# Patient Record
Sex: MALE | Race: WHITE | NOT HISPANIC OR LATINO | URBAN - METROPOLITAN AREA
[De-identification: names, ages, dates, MRNs, and addresses within clinical notes are randomized per-mention and may not be internally consistent; named-entity substitution may affect disease eponyms.]

---

## 2022-06-04 ENCOUNTER — TELEPHONE ENCOUNTER (OUTPATIENT)
Dept: URBAN - METROPOLITAN AREA CLINIC 68 | Facility: CLINIC | Age: 77
End: 2022-06-04

## 2022-06-04 RX ORDER — POLYETHYLENE GLYCOL 3350, SODIUM SULFATE, SODIUM CHLORIDE, POTASSIUM CHLORIDE, ASCORBIC ACID, SODIUM ASCORBATE 7.5-2.691G
KIT ORAL AS DIRECTED
Qty: 1 | Refills: 0 | OUTPATIENT
Start: 2014-02-24 | End: 2014-02-25

## 2022-06-04 RX ORDER — FLUCONAZOLE 100 MG/1
TABLET ORAL AS DIRECTED
Qty: 11 | Refills: 0 | OUTPATIENT
Start: 2019-06-25 | End: 2019-07-05

## 2022-06-04 RX ORDER — SODIUM SULFATE, POTASSIUM SULFATE, MAGNESIUM SULFATE 17.5; 3.13; 1.6 G/ML; G/ML; G/ML
SOLUTION, CONCENTRATE ORAL AS DIRECTED
Qty: 1 | Refills: 0 | OUTPATIENT
Start: 2019-06-19 | End: 2019-06-20

## 2022-06-04 RX ORDER — SODIUM SULFATE, POTASSIUM SULFATE, MAGNESIUM SULFATE 17.5; 3.13; 1.6 G/ML; G/ML; G/ML
SOLUTION, CONCENTRATE ORAL AS DIRECTED
Qty: 1 | Refills: 0 | OUTPATIENT
Start: 2019-06-20 | End: 2019-06-21

## 2022-06-04 RX ORDER — SODIUM SULFATE, POTASSIUM SULFATE, MAGNESIUM SULFATE 17.5; 3.13; 1.6 G/ML; G/ML; G/ML
SOLUTION, CONCENTRATE ORAL AS DIRECTED
Qty: 1 | Refills: 0 | OUTPATIENT
Start: 2019-05-29 | End: 2019-05-30

## 2022-06-04 RX ORDER — RABEPRAZOLE SODIUM 20 MG/1
TABLET, DELAYED RELEASE ORAL
OUTPATIENT
Start: 2008-08-11 | End: 2014-02-24

## 2022-06-05 ENCOUNTER — TELEPHONE ENCOUNTER (OUTPATIENT)
Dept: URBAN - METROPOLITAN AREA CLINIC 68 | Facility: CLINIC | Age: 77
End: 2022-06-05

## 2022-06-05 RX ORDER — LORATADINE 5 MG/5 ML
CLARITIN( 10MG ORAL  DAILY ) ACTIVE -HX ENTRY SOLUTION, ORAL ORAL DAILY
Status: ACTIVE | COMMUNITY
Start: 2020-05-14

## 2022-06-05 RX ORDER — OMEPRAZOLE 20 MG/1
OMEPRAZOLE( 20MG ORAL  DAILY ) ACTIVE -HX ENTRY TABLET, DELAYED RELEASE ORAL DAILY
Status: ACTIVE | COMMUNITY
Start: 2020-05-14

## 2022-06-05 RX ORDER — APIXABAN 5 MG/1
ELIQUIS( 5MG ORAL 1 TWICE A DAY ) ACTIVE -HX ENTRY TABLET, FILM COATED ORAL TWICE A DAY
Status: ACTIVE | COMMUNITY
Start: 2020-05-14

## 2022-06-05 RX ORDER — ROSUVASTATIN CALCIUM 20 MG
CRESTOR( 20MG ORAL 1 DAILY ) ACTIVE -HX ENTRY TABLET ORAL DAILY
Status: ACTIVE | COMMUNITY
Start: 2020-05-14

## 2022-06-15 ENCOUNTER — TELEPHONE ENCOUNTER (OUTPATIENT)
Dept: URBAN - METROPOLITAN AREA CLINIC 68 | Facility: CLINIC | Age: 77
End: 2022-06-15

## 2022-06-25 ENCOUNTER — OFFICE VISIT (OUTPATIENT)
Dept: URBAN - METROPOLITAN AREA CLINIC 68 | Facility: CLINIC | Age: 77
End: 2022-06-25

## 2022-06-25 ENCOUNTER — TELEPHONE ENCOUNTER (OUTPATIENT)
Age: 77
End: 2022-06-25

## 2022-06-25 RX ORDER — CHOLESTYRAMINE 4 G/9G
QUESTRAN(    1 SCOOP IN OJ EACH DAY ) INACTIVE -HX ENTRY POWDER, FOR SUSPENSION ORAL
OUTPATIENT
Start: 2008-08-15

## 2022-06-25 RX ORDER — SODIUM SULFATE, POTASSIUM SULFATE, MAGNESIUM SULFATE 17.5; 3.13; 1.6 G/ML; G/ML; G/ML
SOLUTION, CONCENTRATE ORAL AS DIRECTED
Qty: 1 | Refills: 0 | OUTPATIENT
Start: 2019-05-29 | End: 2019-05-30

## 2022-06-25 RX ORDER — ASPIRIN 81 MG/1
HCA ASPIRIN(    1 TABLET DAILY ) INACTIVE -HX ENTRY TABLET, COATED ORAL
OUTPATIENT
Start: 2008-08-11

## 2022-06-25 RX ORDER — RABEPRAZOLE SODIUM 20 MG/1
TABLET, DELAYED RELEASE ORAL
OUTPATIENT
Start: 2008-08-11 | End: 2014-02-24

## 2022-06-25 RX ORDER — SODIUM SULFATE, POTASSIUM SULFATE, MAGNESIUM SULFATE 17.5; 3.13; 1.6 G/ML; G/ML; G/ML
SOLUTION, CONCENTRATE ORAL AS DIRECTED
Qty: 1 | Refills: 0 | OUTPATIENT
Start: 2019-06-19 | End: 2019-06-20

## 2022-06-25 RX ORDER — POLYETHYLENE GLYCOL 3350, SODIUM SULFATE, SODIUM CHLORIDE, POTASSIUM CHLORIDE, ASCORBIC ACID, SODIUM ASCORBATE 7.5-2.691G
KIT ORAL AS DIRECTED
Qty: 1 | Refills: 0 | OUTPATIENT
Start: 2014-02-24 | End: 2014-02-25

## 2022-06-25 RX ORDER — SODIUM SULFATE, POTASSIUM SULFATE, MAGNESIUM SULFATE 17.5; 3.13; 1.6 G/ML; G/ML; G/ML
SOLUTION, CONCENTRATE ORAL AS DIRECTED
Qty: 1 | Refills: 0 | OUTPATIENT
Start: 2019-06-20 | End: 2019-06-21

## 2022-06-25 RX ORDER — FLUCONAZOLE 100 MG/1
TABLET ORAL AS DIRECTED
Qty: 11 | Refills: 0 | OUTPATIENT
Start: 2019-06-25 | End: 2019-07-05

## 2022-06-26 ENCOUNTER — TELEPHONE ENCOUNTER (OUTPATIENT)
Age: 77
End: 2022-06-26

## 2022-06-26 RX ORDER — OMEPRAZOLE 20 MG/1
OMEPRAZOLE( 20MG ORAL  DAILY ) ACTIVE -HX ENTRY TABLET, DELAYED RELEASE ORAL DAILY
Status: ACTIVE | COMMUNITY
Start: 2020-05-14

## 2022-06-26 RX ORDER — VERAPAMIL HYDROCHLORIDE 100 MG/1
VERAPAMIL HCL(   1 TWICE A DAY ) ACTIVE -HX ENTRY CAPSULE, EXTENDED RELEASE ORAL TWICE A DAY
Status: ACTIVE | COMMUNITY
Start: 2008-08-11

## 2022-06-26 RX ORDER — LORATADINE 5 MG/5 ML
CLARITIN( 10MG ORAL  DAILY ) ACTIVE -HX ENTRY SOLUTION, ORAL ORAL DAILY
Status: ACTIVE | COMMUNITY
Start: 2020-05-14

## 2022-06-26 RX ORDER — ROSUVASTATIN CALCIUM 20 MG
CRESTOR( 20MG ORAL 1 DAILY ) ACTIVE -HX ENTRY TABLET ORAL DAILY
Status: ACTIVE | COMMUNITY
Start: 2020-05-14

## 2022-06-26 RX ORDER — APIXABAN 5 MG/1
ELIQUIS( 5MG ORAL 1 TWICE A DAY ) ACTIVE -HX ENTRY TABLET, FILM COATED ORAL TWICE A DAY
Status: ACTIVE | COMMUNITY
Start: 2020-05-14

## 2023-01-03 ENCOUNTER — OFFICE VISIT (OUTPATIENT)
Dept: URBAN - METROPOLITAN AREA CLINIC 68 | Facility: CLINIC | Age: 78
End: 2023-01-03

## 2023-01-03 RX ORDER — APIXABAN 5 MG/1
ELIQUIS( 5MG ORAL 1 TWICE A DAY ) ACTIVE -HX ENTRY TABLET, FILM COATED ORAL TWICE A DAY
Status: ACTIVE | COMMUNITY
Start: 2020-05-14

## 2023-01-03 RX ORDER — SOD SULF/POT CHLORIDE/MAG SULF 1.479 G
AS DIRECTED TABLET ORAL ONCE
Qty: 1 KIT | OUTPATIENT

## 2023-01-03 RX ORDER — LORATADINE 5 MG/5 ML
CLARITIN( 10MG ORAL  DAILY ) ACTIVE -HX ENTRY SOLUTION, ORAL ORAL DAILY
Status: DISCONTINUED | COMMUNITY
Start: 2020-05-14

## 2023-01-03 RX ORDER — PEPPERMINT OIL 90 MG
AS DIRECTED CAPSULE, DELAYED, AND EXTENDED RELEASE ORAL
Status: ACTIVE | COMMUNITY

## 2023-01-03 RX ORDER — ROSUVASTATIN CALCIUM 20 MG
CRESTOR( 20MG ORAL 1 DAILY ) ACTIVE -HX ENTRY TABLET ORAL DAILY
Status: ACTIVE | COMMUNITY
Start: 2020-05-14

## 2023-01-03 RX ORDER — OMEPRAZOLE 20 MG/1
OMEPRAZOLE( 20MG ORAL  DAILY ) ACTIVE -HX ENTRY TABLET, DELAYED RELEASE ORAL DAILY
Status: ACTIVE | COMMUNITY
Start: 2020-05-14

## 2023-01-03 NOTE — EXAM-MIGRATED EXAMINATIONS
General Examination: General appearance: -> alert, pleasant, well-nourished and in no acute distress   Head: -> normocephalic, atraumatic   Eyes: -> normal   Neck / thyroid: -> neck is supple, with full range of motion and no cervical lymphadenopathy   Skin: -> skin is warm and dry, with no rashes, good skin turgor and normal hair distribution   Heart: -> regular rate and rhythm without murmurs, gallops, clicks or rubs   Lungs: -> clear to auscultation bilaterally, with good air movement and no rales, rhonchi or wheezes   Chest: -> chest wall with no costochondral junction tenderness, no rib deformity and normal shape and expansion   Abdomen: -> soft with good bowel sounds, nontender, and no masses or hepatosplenomegaly , negative Ga's sign   Extremities: -> normal extremity with no clubbing, cyanosis or edema   Neurologic: -> alert and oriented

## 2023-02-09 ENCOUNTER — TELEPHONE ENCOUNTER (OUTPATIENT)
Dept: URBAN - METROPOLITAN AREA CLINIC 68 | Facility: CLINIC | Age: 78
End: 2023-02-09

## 2023-02-14 ENCOUNTER — OFFICE VISIT (OUTPATIENT)
Dept: URBAN - METROPOLITAN AREA SURGERY CENTER 12 | Facility: SURGERY CENTER | Age: 78
End: 2023-02-14

## 2023-04-08 ENCOUNTER — TELEPHONE ENCOUNTER (OUTPATIENT)
Dept: URBAN - METROPOLITAN AREA CLINIC 68 | Facility: CLINIC | Age: 78
End: 2023-04-08

## 2023-04-11 ENCOUNTER — OFFICE VISIT (OUTPATIENT)
Dept: URBAN - METROPOLITAN AREA SURGERY CENTER 12 | Facility: SURGERY CENTER | Age: 78
End: 2023-04-11

## 2023-04-11 RX ORDER — OMEPRAZOLE 20 MG/1
OMEPRAZOLE( 20MG ORAL  DAILY ) ACTIVE -HX ENTRY TABLET, DELAYED RELEASE ORAL DAILY
Status: ACTIVE | COMMUNITY
Start: 2020-05-14

## 2023-04-11 RX ORDER — FLUCONAZOLE 100 MG/1
1 TABLET TABLET ORAL DAILY
Qty: 14 TABLET | OUTPATIENT

## 2023-04-11 RX ORDER — ROSUVASTATIN CALCIUM 20 MG
CRESTOR( 20MG ORAL 1 DAILY ) ACTIVE -HX ENTRY TABLET ORAL DAILY
Status: ACTIVE | COMMUNITY
Start: 2020-05-14

## 2023-04-11 RX ORDER — SOD SULF/POT CHLORIDE/MAG SULF 1.479 G
AS DIRECTED TABLET ORAL ONCE
Qty: 1 KIT | Status: ACTIVE | COMMUNITY

## 2023-04-11 RX ORDER — APIXABAN 5 MG/1
ELIQUIS( 5MG ORAL 1 TWICE A DAY ) ACTIVE -HX ENTRY TABLET, FILM COATED ORAL TWICE A DAY
Status: ACTIVE | COMMUNITY
Start: 2020-05-14

## 2023-04-11 RX ORDER — PEPPERMINT OIL 90 MG
AS DIRECTED CAPSULE, DELAYED, AND EXTENDED RELEASE ORAL
Status: ACTIVE | COMMUNITY

## 2023-04-17 ENCOUNTER — TELEPHONE ENCOUNTER (OUTPATIENT)
Dept: URBAN - METROPOLITAN AREA CLINIC 68 | Facility: CLINIC | Age: 78
End: 2023-04-17

## 2023-04-28 ENCOUNTER — DASHBOARD ENCOUNTERS (OUTPATIENT)
Age: 78
End: 2023-04-28

## 2023-04-28 ENCOUNTER — OFFICE VISIT (OUTPATIENT)
Dept: URBAN - METROPOLITAN AREA CLINIC 68 | Facility: CLINIC | Age: 78
End: 2023-04-28

## 2023-04-28 RX ORDER — APIXABAN 5 MG/1
ELIQUIS( 5MG ORAL 1 TWICE A DAY ) ACTIVE -HX ENTRY TABLET, FILM COATED ORAL TWICE A DAY
Status: ACTIVE | COMMUNITY
Start: 2020-05-14

## 2023-04-28 RX ORDER — OMEPRAZOLE 20 MG/1
OMEPRAZOLE( 20MG ORAL  DAILY ) ACTIVE -HX ENTRY TABLET, DELAYED RELEASE ORAL DAILY
Status: ACTIVE | COMMUNITY
Start: 2020-05-14

## 2023-04-28 RX ORDER — FLUCONAZOLE 100 MG/1
1 TABLET TABLET ORAL DAILY
Qty: 14 TABLET | Status: ACTIVE | COMMUNITY

## 2023-04-28 RX ORDER — SOD SULF/POT CHLORIDE/MAG SULF 1.479 G
AS DIRECTED TABLET ORAL ONCE
Qty: 1 KIT | Status: ACTIVE | COMMUNITY

## 2023-04-28 RX ORDER — PEPPERMINT OIL 90 MG
AS DIRECTED CAPSULE, DELAYED, AND EXTENDED RELEASE ORAL
Status: ACTIVE | COMMUNITY

## 2023-04-28 RX ORDER — ROSUVASTATIN CALCIUM 20 MG
CRESTOR( 20MG ORAL 1 DAILY ) ACTIVE -HX ENTRY TABLET ORAL DAILY
Status: ACTIVE | COMMUNITY
Start: 2020-05-14

## 2023-04-28 NOTE — HPI-MIGRATED HPI
Transition to Care : 78 y.o. WM with a history of SSBE, personal history of colon polyps, diverticulosis, and esophageal Candidiasis on recent EGD and colonoscopy who is here for follow up. Pathology showed esophageal Candidiasis and he is recommended to rinse thoroughly following use of steroid nasal sprays. He did take Diflucan therapy. He denies any n/v, no gib, no pain. 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 There was diverticulosis and IH.  He describes having some sporadic mid abdominal pain which occurs sporadically and is not related to diet or bowel movements. He is on a PPI daily.

## 2023-05-09 ENCOUNTER — OFFICE VISIT (OUTPATIENT)
Dept: URBAN - METROPOLITAN AREA CLINIC 68 | Facility: CLINIC | Age: 78
End: 2023-05-09

## 2023-05-13 ENCOUNTER — TELEPHONE ENCOUNTER (OUTPATIENT)
Dept: URBAN - METROPOLITAN AREA CLINIC 68 | Facility: CLINIC | Age: 78
End: 2023-05-13

## 2025-01-15 ENCOUNTER — OFFICE VISIT (OUTPATIENT)
Dept: URBAN - METROPOLITAN AREA CLINIC 68 | Facility: CLINIC | Age: 80
End: 2025-01-15
Payer: COMMERCIAL

## 2025-01-15 VITALS
HEIGHT: 69 IN | DIASTOLIC BLOOD PRESSURE: 82 MMHG | SYSTOLIC BLOOD PRESSURE: 124 MMHG | WEIGHT: 177 LBS | BODY MASS INDEX: 26.22 KG/M2

## 2025-01-15 DIAGNOSIS — K64.9 ACUTE HEMORRHOID: ICD-10-CM

## 2025-01-15 DIAGNOSIS — K59.09 ACUTE CONSTIPATION IN CHILDHOOD: ICD-10-CM

## 2025-01-15 DIAGNOSIS — R19.4 CHANGE IN BOWEL HABITS: ICD-10-CM

## 2025-01-15 DIAGNOSIS — K57.30 DIVERTICULA, COLON: ICD-10-CM

## 2025-01-15 DIAGNOSIS — K40.90 RIGHT INGUINAL HERNIA: ICD-10-CM

## 2025-01-15 DIAGNOSIS — K63.5 BENIGN COLON POLYP: ICD-10-CM

## 2025-01-15 PROBLEM — 14760008: Status: ACTIVE | Noted: 2025-01-15

## 2025-01-15 PROBLEM — 236021006: Status: ACTIVE | Noted: 2025-01-15

## 2025-01-15 PROBLEM — 88111009: Status: ACTIVE | Noted: 2025-01-15

## 2025-01-15 PROBLEM — 733657002: Status: ACTIVE | Noted: 2025-01-15

## 2025-01-15 PROBLEM — 68496003: Status: ACTIVE | Noted: 2025-01-15

## 2025-01-15 PROCEDURE — 99214 OFFICE O/P EST MOD 30 MIN: CPT | Performed by: INTERNAL MEDICINE

## 2025-01-15 RX ORDER — PEPPERMINT OIL 90 MG
AS DIRECTED CAPSULE, DELAYED, AND EXTENDED RELEASE ORAL
Status: ACTIVE | COMMUNITY

## 2025-01-15 RX ORDER — ROSUVASTATIN CALCIUM 20 MG
CRESTOR( 20MG ORAL 1 DAILY ) ACTIVE -HX ENTRY TABLET ORAL DAILY
Status: ACTIVE | COMMUNITY
Start: 2020-05-14

## 2025-01-15 RX ORDER — OMEPRAZOLE 20 MG/1
OMEPRAZOLE( 20MG ORAL  DAILY ) ACTIVE -HX ENTRY TABLET, DELAYED RELEASE ORAL DAILY
Status: ACTIVE | COMMUNITY
Start: 2020-05-14

## 2025-01-15 RX ORDER — FLUCONAZOLE 100 MG/1
1 TABLET TABLET ORAL DAILY
Qty: 14 TABLET | Status: DISCONTINUED | COMMUNITY

## 2025-01-15 RX ORDER — APIXABAN 5 MG/1
ELIQUIS( 5MG ORAL 1 TWICE A DAY ) ACTIVE -HX ENTRY TABLET, FILM COATED ORAL TWICE A DAY
Status: ACTIVE | COMMUNITY
Start: 2020-05-14

## 2025-01-15 RX ORDER — SOD SULF/POT CHLORIDE/MAG SULF 1.479 G
AS DIRECTED TABLET ORAL ONCE
Qty: 1 KIT | Status: DISCONTINUED | COMMUNITY

## 2025-01-15 NOTE — HPI-TODAY'S VISIT:
79 y.o. WM with a history of SSBE, personal history of colon polyps, diverticulosis, who  has noticed change in bowel habits over the past year with constipation, straining and hard stools. He has been only taking Colace w/o relief.  He is s/p a colonoscopy in 4/2023 which showed diverticulosis, colon polyp, IH. 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 He has bilateral inguinal hernias and is planned to have surgery with Dr. Avila in early February. He is recommended to start Magnesium Oxide 400mg-500mg/d for constipation and use Ibgard prn.

## 2025-02-12 ENCOUNTER — OFFICE VISIT (OUTPATIENT)
Dept: URBAN - METROPOLITAN AREA CLINIC 68 | Facility: CLINIC | Age: 80
End: 2025-02-12
Payer: COMMERCIAL

## 2025-02-12 VITALS
OXYGEN SATURATION: 98 % | HEIGHT: 69 IN | WEIGHT: 177.8 LBS | BODY MASS INDEX: 26.33 KG/M2 | DIASTOLIC BLOOD PRESSURE: 82 MMHG | TEMPERATURE: 997.8 F | SYSTOLIC BLOOD PRESSURE: 124 MMHG | HEART RATE: 66 BPM

## 2025-02-12 DIAGNOSIS — K22.70 BARRETT'S ESOPHAGUS WITHOUT DYSPLASIA: ICD-10-CM

## 2025-02-12 DIAGNOSIS — K57.30 DIVERTICULA, COLON: ICD-10-CM

## 2025-02-12 DIAGNOSIS — K63.5 BENIGN COLON POLYP: ICD-10-CM

## 2025-02-12 DIAGNOSIS — K59.09 ACUTE CONSTIPATION IN CHILDHOOD: ICD-10-CM

## 2025-02-12 PROCEDURE — 99213 OFFICE O/P EST LOW 20 MIN: CPT

## 2025-02-12 RX ORDER — OMEPRAZOLE 20 MG/1
OMEPRAZOLE( 20MG ORAL  DAILY ) ACTIVE -HX ENTRY TABLET, DELAYED RELEASE ORAL DAILY
Status: ACTIVE | COMMUNITY
Start: 2020-05-14

## 2025-02-12 RX ORDER — APIXABAN 5 MG/1
ELIQUIS( 5MG ORAL 1 TWICE A DAY ) ACTIVE -HX ENTRY TABLET, FILM COATED ORAL TWICE A DAY
Status: ACTIVE | COMMUNITY
Start: 2020-05-14

## 2025-02-12 RX ORDER — PEPPERMINT OIL 90 MG
AS DIRECTED CAPSULE, DELAYED, AND EXTENDED RELEASE ORAL
Status: ACTIVE | COMMUNITY

## 2025-02-12 RX ORDER — ROSUVASTATIN CALCIUM 20 MG
CRESTOR( 20MG ORAL 1 DAILY ) ACTIVE -HX ENTRY TABLET ORAL DAILY
Status: ACTIVE | COMMUNITY
Start: 2020-05-14

## 2025-02-12 NOTE — HPI-TODAY'S VISIT:
79 y.o. WM with a history of SSBE, personal history of colon polyps, diverticulosis, who has noticed change in bowel habits over the past year with constipation, straining and hard stools presents for f/u. He is s/p a colonoscopy in 4/2023 which showed diverticulosis, colon polyp, IH. 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 He had bilateral inguinal hernia surgery with Dr. Avila last week and is doing well. He is taking Magnesium Oxide 400mg-500mg/d every other day with great control of constipation.